# Patient Record
Sex: MALE | Race: BLACK OR AFRICAN AMERICAN | URBAN - METROPOLITAN AREA
[De-identification: names, ages, dates, MRNs, and addresses within clinical notes are randomized per-mention and may not be internally consistent; named-entity substitution may affect disease eponyms.]

---

## 2020-06-08 LAB
ABS NEUT (OLG): 3.02 X10(3)/MCL (ref 2.1–9.2)
ALBUMIN SERPL-MCNC: 4.6 GM/DL (ref 3.5–5)
ALBUMIN/GLOB SERPL: 1.4 RATIO (ref 1.1–2)
ALP SERPL-CCNC: 66 UNIT/L (ref 40–150)
ALT SERPL-CCNC: 18 UNIT/L (ref 0–55)
AST SERPL-CCNC: 25 UNIT/L (ref 5–34)
BASOPHILS # BLD AUTO: 0.1 X10(3)/MCL (ref 0–0.2)
BASOPHILS NFR BLD AUTO: 1 %
BILIRUB SERPL-MCNC: 0.5 MG/DL
BILIRUBIN DIRECT+TOT PNL SERPL-MCNC: 0.2 MG/DL (ref 0–0.5)
BILIRUBIN DIRECT+TOT PNL SERPL-MCNC: 0.3 MG/DL (ref 0–0.8)
BUN SERPL-MCNC: 11.3 MG/DL (ref 8.4–25.7)
CALCIUM SERPL-MCNC: 10.4 MG/DL (ref 8.4–10.2)
CHLORIDE SERPL-SCNC: 102 MMOL/L (ref 98–107)
CO2 SERPL-SCNC: 26 MMOL/L (ref 22–29)
CREAT SERPL-MCNC: 0.9 MG/DL (ref 0.73–1.18)
EOSINOPHIL # BLD AUTO: 0.2 X10(3)/MCL (ref 0–0.9)
EOSINOPHIL NFR BLD AUTO: 3 %
ERYTHROCYTE [DISTWIDTH] IN BLOOD BY AUTOMATED COUNT: 14.1 % (ref 11.5–17)
GLOBULIN SER-MCNC: 3.4 GM/DL (ref 2.4–3.5)
GLUCOSE SERPL-MCNC: 92 MG/DL (ref 74–100)
HCT VFR BLD AUTO: 48 % (ref 42–52)
HGB BLD-MCNC: 15.1 GM/DL (ref 14–18)
LYMPHOCYTES # BLD AUTO: 2 X10(3)/MCL (ref 0.6–4.6)
LYMPHOCYTES NFR BLD AUTO: 35 %
MCH RBC QN AUTO: 28 PG (ref 27–31)
MCHC RBC AUTO-ENTMCNC: 31.5 GM/DL (ref 33–36)
MCV RBC AUTO: 88.9 FL (ref 80–94)
MONOCYTES # BLD AUTO: 0.4 X10(3)/MCL (ref 0.1–1.3)
MONOCYTES NFR BLD AUTO: 8 %
NEUTROPHILS # BLD AUTO: 3.02 X10(3)/MCL (ref 2.1–9.2)
NEUTROPHILS NFR BLD AUTO: 53 %
PLATELET # BLD AUTO: 274 X10(3)/MCL (ref 130–400)
PMV BLD AUTO: 10 FL (ref 9.4–12.4)
POTASSIUM SERPL-SCNC: 3.5 MMOL/L (ref 3.5–5.1)
PROT SERPL-MCNC: 8 GM/DL (ref 6.4–8.3)
RBC # BLD AUTO: 5.4 X10(6)/MCL (ref 4.7–6.1)
SODIUM SERPL-SCNC: 141 MMOL/L (ref 136–145)
WBC # SPEC AUTO: 5.7 X10(3)/MCL (ref 4.5–11.5)

## 2020-06-11 ENCOUNTER — HISTORICAL (OUTPATIENT)
Dept: SURGERY | Facility: HOSPITAL | Age: 55
End: 2020-06-11

## 2022-04-11 ENCOUNTER — HISTORICAL (OUTPATIENT)
Dept: ADMINISTRATIVE | Facility: HOSPITAL | Age: 57
End: 2022-04-11

## 2022-04-28 VITALS
DIASTOLIC BLOOD PRESSURE: 78 MMHG | BODY MASS INDEX: 30.19 KG/M2 | SYSTOLIC BLOOD PRESSURE: 118 MMHG | WEIGHT: 176.81 LBS | HEIGHT: 64 IN

## 2022-04-30 NOTE — OP NOTE
DATE OF SURGERY:    06/11/2020    SURGEON:  José Luis Hackett MD    PREOPERATIVE DIAGNOSIS:  Erectile dysfunction.    POSTOPERATIVE DIAGNOSIS:  Erectile dysfunction.    PROCEDURE:  Inflatable penile prosthesis with Coloplast Titan device.    FINDINGS:  Corporal measurements were 14 cm distally and 10 cm proximally for a total length of 24 cm.  I used 24 cm cylinders and a 125 cc reservoir.    DESCRIPTION OF PROCEDURE:  After informed consent was obtained, the patient was taken to the operating room after receiving a preoperative dose of vancomycin and gentamicin.  He was taken to the operating room and given a general anesthetic in supine position.  His abdomen, genitals, perineum, and lower extremities above the knees were all prepped and draped in the usual sterile fashion.  I placed a 16-Bulgarian catheter through the urethra into the bladder.  I used a Jurgen deep scrotal retractor to place tension on the penis.  I used a #15 blade to make a horizontal penoscrotal incision and divided the scrotal layers down to the corporal bodies, as well as to expose the urethra, to prevent any injury.  I placed stay sutures into the corpora at the level of the penoscrotal junction on each side of the midline, on both sides, I then used a 15 blade to make corporotomies at this level.  I made a first pass with curved Metzenbaum scissors into the distal corpora.  I then dilated the corpora with a measuring device distally and proximally.  He measured 14 cm distally and 10 cm proximally for a total length of 24 cm.  I irrigated both corpora with rifampin and gentamicin irrigation.  I then prepped 24 cm Titan cylinders on the back table and soaked them in rifampin and gentamicin, using the Ras device, I passed a needle and strings through the distal corporal body and glans on both sides.  I placed the proximal ends of the cylinders into position and pulled the distal ends into position with the strings.  I closed the  corporotomies with my previously placed stay sutures.  Then using the quick connectors, I connected a pump, which was prepped on the back table and soaked in rifampin and gentamicin to the cylinders.  Following this, I used a suction device to empty all the urine out of the bladder.  I then used a curved nasal speculum to perforate the left transversalis fascia.  I made a space underneath the transversalis fascia and I placed a 125 cc reservoir in this extraperitoneal space after it was soaked in rifampin and gentamicin.  I filled it with 125 cc of normal saline.  Using the quick connectors I connected the reservoir to the pump which was then placed in the dependent portion of the scrotum.  I placed a #15 Neftaly drain into the scrotum.  I left that exiting the left lower quadrant and I sewed that in place with 2-0 silk suture.  I closed the scrotal incision with a running 2-0 Vicryl stitch for the dartos fascia, followed by a running 4-0 Monocryl subcuticular stitch for the skin.  I placed Dermabond glue over the incision.  He tolerated the procedure well.    ESTIMATED BLOOD LOSS:  20 cc.     There were no apparent complications.  He was extubated and brought to recovery in good condition.        ______________________________  MD YAKELIN Jordan/JENNA  DD:  06/11/2020  Time:  06:33PM  DT:  06/11/2020  Time:  06:46PM  Job #:  [id number]